# Patient Record
Sex: FEMALE | Race: BLACK OR AFRICAN AMERICAN
[De-identification: names, ages, dates, MRNs, and addresses within clinical notes are randomized per-mention and may not be internally consistent; named-entity substitution may affect disease eponyms.]

---

## 2017-11-21 ENCOUNTER — HOSPITAL ENCOUNTER (EMERGENCY)
Dept: HOSPITAL 74 - JER | Age: 32
Discharge: HOME | End: 2017-11-21
Payer: COMMERCIAL

## 2017-11-21 VITALS — SYSTOLIC BLOOD PRESSURE: 139 MMHG | TEMPERATURE: 97.6 F | DIASTOLIC BLOOD PRESSURE: 62 MMHG | HEART RATE: 83 BPM

## 2017-11-21 VITALS — BODY MASS INDEX: 26.2 KG/M2

## 2017-11-21 DIAGNOSIS — H81.10: Primary | ICD-10-CM

## 2017-11-21 DIAGNOSIS — Z98.84: ICD-10-CM

## 2017-11-21 DIAGNOSIS — E87.6: ICD-10-CM

## 2017-11-21 DIAGNOSIS — I10: ICD-10-CM

## 2017-11-21 LAB
ALBUMIN SERPL-MCNC: 4.2 G/DL (ref 3.4–5)
ALP SERPL-CCNC: 60 U/L (ref 45–117)
ALT SERPL-CCNC: 36 U/L (ref 12–78)
ANION GAP SERPL CALC-SCNC: 9 MMOL/L (ref 8–16)
APPEARANCE UR: (no result)
AST SERPL-CCNC: 18 U/L (ref 15–37)
BASOPHILS # BLD: 0.3 % (ref 0–2)
BILIRUB SERPL-MCNC: 0.6 MG/DL (ref 0.2–1)
BILIRUB UR STRIP.AUTO-MCNC: NEGATIVE MG/DL
CALCIUM SERPL-MCNC: 9.3 MG/DL (ref 8.5–10.1)
CK SERPL-CCNC: 89 IU/L (ref 26–192)
CO2 SERPL-SCNC: 30 MMOL/L (ref 21–32)
COLOR UR: (no result)
CREAT SERPL-MCNC: 0.8 MG/DL (ref 0.55–1.02)
DEPRECATED RDW RBC AUTO: 18.6 % (ref 11.6–15.6)
EOSINOPHIL # BLD: 1.1 % (ref 0–4.5)
GLUCOSE SERPL-MCNC: 101 MG/DL (ref 74–106)
KETONES UR QL STRIP: (no result)
MAGNESIUM SERPL-MCNC: 1.8 MG/DL (ref 1.8–2.4)
MCH RBC QN AUTO: 24.8 PG (ref 25.7–33.7)
MCHC RBC AUTO-ENTMCNC: 32.7 G/DL (ref 32–36)
MCV RBC: 75.9 FL (ref 80–96)
NEUTROPHILS # BLD: 61.5 % (ref 42.8–82.8)
NITRITE UR QL STRIP: NEGATIVE
PH UR: 5 [PH] (ref 5–8)
PLATELET # BLD AUTO: 223 K/MM3 (ref 134–434)
PMV BLD: 9.4 FL (ref 7.5–11.1)
PROT SERPL-MCNC: 7.9 G/DL (ref 6.4–8.2)
PROT UR QL STRIP: NEGATIVE
PROT UR QL STRIP: NEGATIVE
RBC # UR STRIP: NEGATIVE /UL
SP GR UR: 1.02 (ref 1–1.03)
TROPONIN I SERPL-MCNC: < 0.02 NG/ML (ref 0–0.05)
UROBILINOGEN UR STRIP-MCNC: (no result) MG/DL (ref 0.2–1)
WBC # BLD AUTO: 6.8 K/MM3 (ref 4–10)

## 2017-11-21 NOTE — PDOC
Attending Attestation





- HPI


HPI: 





11/21/17 18:42


The patient is a 32 year old female with a significant PMH of remote asthma, 

bipolar depression, anxiety, and s/p gastric sleeve who presents to the ER with 

lightheadedness beginning this morning. The patient describes that the 

lightheadedness is triggered by change in position and is improved by rest. The 

patient reports fainting earlier today, which was partially witnessed by her 

. She denies hitting their head, tongue biting, or urinary incontinence 

but reports being confused for a few minutes. 





 





- Physicial Exam


PE: 





11/21/17 18:44


Vitals: Triage Vital signs reviewed


General Appearance:  no acute distress, well nourished well developed,


Head: Atraumatic, normocephalic


Neck:  Supple;No Nuchal rigidity


Chest Wall: Nontender


Cardiac: Regular rate and rhythm, no murmurs, no rubs, no gallops,


Lungs: Clear to auscultation bilateral, good air movement bilaterally,


Abdomen:  Soft, nondistended, normal bowel sounds, nontender to palpation


Extremities: Full range of motion to all extremities, no cyanosis, clubbing, or 

edema


Skin:  Warm and dry, no rashes or lesions, no petechiae


Neuro: AOX3; Cranial Nerves 2-12 grossly intact, Strength intact to all 

extremities, Sensation intact to all extremities, gait normal


Psych:  normal mood, normal affect








- Medical Decision Making





11/21/17 18:45


Plan: 


Care: Orthostatics 


Labs: UA


Cardiology: EKG 


Medication: Sodium Chloride


Radiology: Chest XR








<Ayan Michael - Last Filed: 11/21/17 18:42>





- Resident


Resident Name: Payton Giles





- ED Attending Attestation


I have performed the following: I have examined & evaluated the patient, The 

case was reviewed & discussed with the resident, I agree w/resident's findings 

& plan, Exceptions are as noted





- Medical Decision Making





32 years old history and examination consistent with positional lightheadedness/

syncope





Patient status post gastric sleeve she has no abdominal pain on examination her 

vital signs stable her labs are within normal limits. Status post 1 L of normal 

saline patient feels much better. I suspect her symptomatology return related 

to dehydration. She will follow-up with her doctor this week she was instructed 

to eat more bananas as her potassium was 3.3 her EKG was normal there was no 

evidence of A. fib other arrhythmias heart block WPW Brugada or prolonged QT








Findings, the need for follow-up and strict return instructions discussed with 

patient.


11/21/17 19:15








<Maurice Smith - Last Filed: 11/21/17 19:15>





**Heart Score/ECG Review


  ** #1





11/21/17 18:45


EKG performed at [17:05] demonstrates rate of normal, rhythm of normal, axis 

equal to [normal]. 


no T wave inversions, no ST elevations.








<Ayan Michael - Last Filed: 11/21/17 18:42>

## 2017-11-21 NOTE — PDOC
History of Present Illness





- General


Chief Complaint: Lightheaded


Stated Complaint: SYNCOPE/NEAR SYNCOPE


Time Seen by Provider: 11/21/17 16:12





- History of Present Illness


Initial Comments: 





11/21/17 18:43


31yo woman with PMH of bipolar/depression and gastric sleeve surgery 3mo ago (

Aug 2017) who has had increasing positional lightheadedness. These symptoms 

began following the surgery, but has been occurring with increasing frequency. 

She feels lightheaded and endorses the room spinning when she is going from a 

sitting or lying position to standing. She had one episode while exercising on 

the treadmill. Denies having these symptoms while at rest. She presented this 

AM following an episode in which she felt lightheaded, but lost consciousness 

for a minute partially witnessed by her . Pt was confused for a few 

minutes following the episode. Denies hitting her head, tonic/clonic movements, 

tongue biting, or urinary/fecal incontinence. She denies any recent changes in 

her medications. 





11/21/17 18:52








Past History





- Past Medical History


Allergies/Adverse Reactions: 


 Allergies











Allergy/AdvReac Type Severity Reaction Status Date / Time


 


No Known Allergies Allergy   Verified 11/21/17 16:12











Home Medications: 


Ambulatory Orders





Losartan Potassium [Cozaar -] 50 mg PO DAILY #30 tablet 08/04/14 


Lamotrigine [Lamictal Xr] 200 mg PO DAILY 06/10/16 


Sertraline HCl [Zoloft -] 50 mg PO DAILY 06/10/16 








Anemia: No


Asthma: No


Cancer: No


Cardiac Disorders: No


CVA: No


COPD: No


Dementia: No


Diabetes: No


GI Disorders: No


 Disorders: No


HTN: Yes


Hypercholesterolemia: No


Liver Disease: No


Psychiatric Problems: Yes (bipolar , anxeity)


Seizures: No


Thyroid Disease: No





- Surgical History


Abdominal Surgery: Yes (gastric sleeve ,hernia mesh)





- Immunization History


Immunization Up to Date: Yes (flu vaccine)





- Suicide/Smoking/Psychosocial Hx


Smoking Status: No


Smoking History: Never smoked


Have you smoked in the past 12 months: No


Number of Cigarettes Smoked Daily: 0


Information on smoking cessation initiated: No


Hx Alcohol Use: No


Drug/Substance Use Hx: No


Substance Use Type: None


Hx Substance Use Treatment: No





**Review of Systems





- Review of Systems


Neurological: Yes: See HPI


All Other Systems: Reviewed and Negative





*Physical Exam





- Vital Signs


 Last Vital Signs











Temp Pulse Resp BP Pulse Ox


 


 97.6 F   83   18   139/62   100 


 


 11/21/17 16:13  11/21/17 16:13  11/21/17 16:13  11/21/17 16:13  11/21/17 16:13














- Physical Exam


General Appearance: Yes: Nourished, Appropriately Dressed


Respiratory/Chest: positive: Lungs Clear, Normal Breath Sounds


Cardiovascular: positive: Regular Rhythm, Regular Rate


Vascular Pulses: Dorsalis-Pedis (R): 2+, Doralis-Pedis (L): 2+


Gastrointestinal/Abdominal: positive: Normal Bowel Sounds, Soft.  negative: 

Tenderness


Neurologic: positive: Fully Oriented, Alert, Other (CNII-XII grossly intact, 

not formally tested)





**Heart Score/ECG Review





- ECG Intrepretation


Rhythm: Regular Rhythm





- ECG Impressions


Normal ECG: Yes


Non-specific ST Elevation: No


Ischemic Changes: No


Comment:: 





11/21/17 18:55


EKG: NSR, rate 67, normal axis, normal intervals (QTc 414), no ischemic changes

, no e/p WPW, Brugada





ED Treatment Course





- LABORATORY


CBC & Chemistry Diagram: 


 11/21/17 16:30





 11/21/17 16:30





- ADDITIONAL ORDERS


Additional order review: 


 Laboratory  Results











  11/21/17 11/21/17





  16:20 16:20


 


Urine Color   Dkyellow


 


Urine Appearance   Slcloudy


 


Urine pH   5.0


 


Ur Specific Gravity   1.018


 


Urine Protein   Negative


 


Urine Glucose (UA)   Negative


 


Urine Ketones   Trace H


 


Urine Blood   Negative


 


Urine Nitrite   Negative


 


Urine Bilirubin   Negative


 


Urine Urobilinogen   4.0 e.u/dl H


 


Urine HCG, Qual  Negative 








 











  11/21/17





  16:30


 


RBC  4.92


 


MCV  75.9 L


 


MCHC  32.7


 


RDW  18.6 H


 


MPV  9.4


 


Neutrophils %  61.5


 


Lymphocytes %  30.5


 


Monocytes %  6.6


 


Eosinophils %  1.1


 


Basophils %  0.3














- Medications


Given in the ED: 


ED Medications














Discontinued Medications














Generic Name Dose Route Start Last Admin





  Trade Name Freq  PRN Reason Stop Dose Admin


 


Sodium Chloride  1,000 mls @ 1,000 mls/hr  11/21/17 16:16  11/21/17 16:46





  Normal Saline -  IV  11/21/17 17:15  1,000 mls/hr





  ASDIR STA   Administration














Medical Decision Making





- Medical Decision Making





11/21/17 18:56





 CBC, BMP





 11/21/17 16:30 





 11/21/17 16:30 





 Troponin, BNP











  11/21/17





  16:30


 


Troponin I  < 0.02








 Urine Test Results











Urine Color  Dkyellow   11/21/17  16:20    


 


Urine Appearance  Slcloudy   11/21/17  16:20    


 


Urine pH  5.0  (5.0-8.0)   11/21/17  16:20    


 


Ur Specific Gravity  1.018  (1.001-1.035)   11/21/17  16:20    


 


Urine Protein  Negative  (NEGATIVE)   11/21/17  16:20    


 


Urine Glucose (UA)  Negative  (NEGATIVE)   11/21/17  16:20    


 


Urine Ketones  Trace  (NEGATIVE)  H  11/21/17  16:20    


 


Urine Blood  Negative  (NEGATIVE)   11/21/17  16:20    


 


Urine Nitrite  Negative  (NEGATIVE)   11/21/17  16:20    


 


Urine Bilirubin  Negative  (NEGATIVE)   11/21/17  16:20    








 Vital Signs











Temperature  97.6 F   11/21/17 16:13


 


Pulse Rate  83   11/21/17 16:13


 


Respiratory Rate  18   11/21/17 16:13


 


Blood Pressure  139/62   11/21/17 16:13


 


O2 Sat by Pulse Oximetry (%)  100   11/21/17 16:13








Patient's VSS


EKG and labs unconcerning for arrhymia, WPW, Brugada; Troponin is negative





Likely patient has vasovagal syncope. She is feeling better after IVF. Her 

vital signs are stable. EKG unconcerning for arrhythmias, WPW, Brugada, and 

Troponin is negative. She is stable to be discharged home, with follow-up with 

her PCP and Neurologist.


 


11/21/17 19:02





11/21/17 19:10








*DC/Admit/Observation/Transfer


Diagnosis at time of Disposition: 


 Postural dizziness with presyncope








- Referrals


Referrals: 


Almas Winston MD [Primary Care Provider] - 





- Patient Instructions


Additional Instructions: 


Please follow-up with your primary care physician within 1-2 weeks to check 

your basic metabolic panel. Your potassium was found to be low, and recommend 

you eating bananas to increase your potassium. You should also continue drink 

lots of fluids (water, seltzer, etc) to maintain hydration.





Please follow-up with a Neurologist (Dr. Stanley, [phone]) within 1-2 

weeks to discuss your dizziness.





Please return to the emergency department if you have new, worsening or 

concerning symptoms.





- Post Discharge Activity

## 2017-11-21 NOTE — PDOC
Rapid Medical Evaluation


Time Seen by Provider: 11/21/17 16:12


Medical Evaluation: 


 Allergies











Allergy/AdvReac Type Severity Reaction Status Date / Time


 


No Known Allergies Allergy   Verified 06/10/16 12:44











11/21/17 16:12


Pt arrives with complaints of: lightheadedness intermittent x 1 month, syncope 

today , fell on floor, denies head injury witnessed by , gastric sleeve 

3 months ago


On exam : vss, 


I have ordered : cbc, comp, mag, ua, upreg, ekg, cardiac profile


Pt will go to : main ed

## 2017-11-22 NOTE — EKG
Test Reason : 

Blood Pressure : ***/*** mmHG

Vent. Rate : 067 BPM     Atrial Rate : 067 BPM

   P-R Int : 152 ms          QRS Dur : 080 ms

    QT Int : 392 ms       P-R-T Axes : 002 031 015 degrees

   QTc Int : 414 ms

 

NORMAL SINUS RHYTHM

NORMAL ECG

WHEN COMPARED WITH ECG OF 11-DEC-2014 23:15,

NO SIGNIFICANT CHANGE WAS FOUND

Confirmed by RAJEEV GRAJEDA MD ([id number]) on 11/22/2017 12:13:18 PM

 

Referred By:             Confirmed By:RAJEEV GRAJEDA MD

## 2019-10-19 ENCOUNTER — HOSPITAL ENCOUNTER (EMERGENCY)
Dept: HOSPITAL 74 - JER | Age: 34
Discharge: HOME | End: 2019-10-19
Payer: COMMERCIAL

## 2019-10-19 VITALS — DIASTOLIC BLOOD PRESSURE: 72 MMHG | TEMPERATURE: 98.2 F | HEART RATE: 88 BPM | SYSTOLIC BLOOD PRESSURE: 131 MMHG

## 2019-10-19 VITALS — BODY MASS INDEX: 30.7 KG/M2

## 2019-10-19 DIAGNOSIS — F31.9: ICD-10-CM

## 2019-10-19 DIAGNOSIS — I10: ICD-10-CM

## 2019-10-19 DIAGNOSIS — H00.015: Primary | ICD-10-CM

## 2019-10-19 DIAGNOSIS — F41.9: ICD-10-CM

## 2019-10-19 DIAGNOSIS — Z98.84: ICD-10-CM

## 2019-10-19 NOTE — PDOC
History of Present Illness





- General


Chief Complaint: Eye Problem


Stated Complaint: SORE EYE


Time Seen by Provider: 10/19/19 10:42


History Source: Patient


Exam Limitations: No Limitations





- History of Present Illness


Initial Comments: 





10/19/19 10:48


Patient here with concerns of left eye swelling in her lower lip.  States 

googled and thinks may have a stye but has never had one before.  Is in 6 

months pregnant and was concerned there may be some issue with the pregnancy.  

Denies visual changes, is mildly painful and mildly pruritic.  Has no drainage


Is this a multiple visit Asthma Patient?: No


Timing/Duration: unsure, 24 hours


Severity: mild





Past History





- Travel


Traveled outside of the country in the last 30 days: No


Close contact w/someone who was outside of country & ill: No





- Past Medical History


Allergies/Adverse Reactions: 


 Allergies











Allergy/AdvReac Type Severity Reaction Status Date / Time


 


No Known Allergies Allergy   Verified 10/19/19 10:30











Home Medications: 


Ambulatory Orders





Losartan Potassium [Cozaar -] 50 mg PO DAILY #30 tablet 08/04/14 


Lamotrigine [Lamictal Xr] 200 mg PO DAILY 06/10/16 


Sertraline HCl [Zoloft -] 50 mg PO DAILY 06/10/16 








Anemia: No


Asthma: No


Cancer: No


Cardiac Disorders: No


CVA: No


COPD: No


Dementia: No


Diabetes: No


GI Disorders: No


 Disorders: No


HTN: Yes


Hypercholesterolemia: No


Liver Disease: No


Psychiatric Problems: Yes (bipolar , anxeity)


Seizures: No


Thyroid Disease: No





- Surgical History


Abdominal Surgery: Yes (gastric sleeve ,hernia mesh)





- Immunization History


Immunization Up to Date: Yes (flu vaccine)





- Psycho Social/Smoking Cessation Hx


Smoking Status: No


Smoking History: Never smoked


Have you smoked in the past 12 months: No


Number of Cigarettes Smoked Daily: 0


Information on smoking cessation initiated: No


Hx Alcohol Use: No


Drug/Substance Use Hx: No


Substance Use Type: None


Hx Substance Use Treatment: No





**Review of Systems





- Review of Systems


Able to Perform ROS?: Yes


Is the patient limited English proficient: Yes


Constitutional: Yes: See HPI.  No: Symptoms Reported, Chills, Fever


HEENTM: Yes: Symptoms Reported, See HPI, Eye Pain (Eyelid).  No: Blurred Vision

, Tearing, Double Vision


Respiratory: Yes: See HPI.  No: Symptoms reported


Integumentary: Yes: Symptoms Reported, See HPI, Lesions (To left lower lid)


All Other Systems: Reviewed and Negative





*Physical Exam





- Vital Signs


 Last Vital Signs











Temp Pulse Resp BP Pulse Ox


 


 98.2 F   88   17   131/72   100 


 


 10/19/19 10:30  10/19/19 10:30  10/19/19 10:30  10/19/19 10:30  10/19/19 10:30














- Physical Exam


General Appearance: Yes: Nourished, Appropriately Dressed.  No: Apparent 

Distress


HEENT: positive: NOVA, Normal ENT Inspection, TMs Normal, Pharynx Normal, Other 

(Left lower lid with pointing lesion to the inner aspect of lower border.  No 

purulent drainage, is mildly tender but no periorbital cellulitis.  Vision is 

within normal limits)


Neck: positive: Supple.  negative: Tender, Lymphadenopathy (R), Lymphadenopathy 

(L)


Respiratory/Chest: positive: Lungs Clear, Normal Breath Sounds


Neurologic: positive: CNs II-XII NML intact, Fully Oriented, Alert, Normal Mood/

Affect, Normal Response, Motor Strength 5/5





Medical Decision Making





- Medical Decision Making





10/19/19 10:51


Lower external stye, will treat conservatively, provide erythromycin ointment 

for lubricating purposes only





Discharge





- Discharge Information


Problems reviewed: No


Clinical Impression/Diagnosis: 


Stye external


Qualifiers:


 Laterality: left Eyelid: lower Qualified Code(s): H00.015 - Hordeolum externum 

left lower eyelid





Condition: Stable


Disposition: HOME





- Admission


No





- Follow up/Referral


Referrals: 


Almas Winston MD [Primary Care Provider] - 





- Patient Discharge Instructions


Patient Printed Discharge Instructions:  DI for Hordeolum


Additional Instructions: 


Rest, avoid rubbing eyes


Hot soaks to I often as possible to help draw the sterile infection to a head 

and allow to drain


This is not generally a dangerous infection and will usually go away hot soaks





Erythromycin ointment to affected eye 3 times a day until healed


UseD primarily for lubricating purposE





Avoid contact with others until redness and discharge is gone from eyes.


Followup with ophthalmology or private physician as needed





- Post Discharge Activity